# Patient Record
(demographics unavailable — no encounter records)

---

## 2024-11-15 NOTE — HISTORY OF PRESENT ILLNESS
[FreeTextEntry1] : 47-year-old -0-0-2 status post KRYSTAL presents for GYN evaluation denies pain bleeding or discharge.  Occasional tenderness around the pelvic area.

## 2024-11-15 NOTE — DISCUSSION/SUMMARY
[FreeTextEntry1] : 47-year-old -0-0-2 status post KRYSTAL presents for GYN evaluation.  Physical examination is normal.  Pap GC chlamydia sent and mammogram ordered.  Diet and exercise reviewed.  Patient to return in 1 year for follow-up.  All questions answered.

## 2024-11-15 NOTE — PHYSICAL EXAM
[Chaperone Present] : A chaperone was present in the examining room during all aspects of the physical examination [[id number]] : A chaperone was present during the pelvic exam. [FreeTextEntry2] : Jessie [Appropriately responsive] : appropriately responsive [Alert] : alert [No Acute Distress] : no acute distress [No Lymphadenopathy] : no lymphadenopathy [Regular Rate Rhythm] : regular rate rhythm [No Murmurs] : no murmurs [Clear to Auscultation B/L] : clear to auscultation bilaterally [Soft] : soft [Non-tender] : non-tender [Non-distended] : non-distended [No HSM] : No HSM [No Lesions] : no lesions [No Mass] : no mass [Oriented x3] : oriented x3 [Examination Of The Breasts] : a normal appearance [No Masses] : no breast masses were palpable [Labia Majora] : normal [Labia Minora] : normal [Normal] : normal [Absent] : absent [Adnexa Tenderness] : tender [Declined] : Patient declined rectal exam